# Patient Record
Sex: FEMALE | Race: WHITE | NOT HISPANIC OR LATINO | Employment: FULL TIME | URBAN - METROPOLITAN AREA
[De-identification: names, ages, dates, MRNs, and addresses within clinical notes are randomized per-mention and may not be internally consistent; named-entity substitution may affect disease eponyms.]

---

## 2024-04-22 ENCOUNTER — OFFICE VISIT (OUTPATIENT)
Dept: PRIMARY CARE | Facility: CLINIC | Age: 49
End: 2024-04-22
Payer: COMMERCIAL

## 2024-04-22 ENCOUNTER — TELEPHONE (OUTPATIENT)
Dept: PRIMARY CARE | Facility: CLINIC | Age: 49
End: 2024-04-22

## 2024-04-22 VITALS
OXYGEN SATURATION: 97 % | WEIGHT: 224 LBS | SYSTOLIC BLOOD PRESSURE: 128 MMHG | HEART RATE: 90 BPM | BODY MASS INDEX: 33.95 KG/M2 | HEIGHT: 68 IN | TEMPERATURE: 98.6 F | DIASTOLIC BLOOD PRESSURE: 83 MMHG

## 2024-04-22 DIAGNOSIS — Z12.11 SCREENING FOR COLON CANCER: ICD-10-CM

## 2024-04-22 DIAGNOSIS — E55.9 VITAMIN D DEFICIENCY: ICD-10-CM

## 2024-04-22 DIAGNOSIS — R06.83 SNORING: ICD-10-CM

## 2024-04-22 DIAGNOSIS — Z00.00 WELL ADULT EXAM: Primary | ICD-10-CM

## 2024-04-22 DIAGNOSIS — E66.09 CLASS 1 OBESITY DUE TO EXCESS CALORIES WITH BODY MASS INDEX (BMI) OF 34.0 TO 34.9 IN ADULT, UNSPECIFIED WHETHER SERIOUS COMORBIDITY PRESENT: ICD-10-CM

## 2024-04-22 DIAGNOSIS — R73.03 PRE-DIABETES: ICD-10-CM

## 2024-04-22 DIAGNOSIS — Z13.6 SCREENING FOR HEART DISEASE: ICD-10-CM

## 2024-04-22 DIAGNOSIS — Z12.31 SCREENING MAMMOGRAM FOR BREAST CANCER: ICD-10-CM

## 2024-04-22 PROCEDURE — 93000 ELECTROCARDIOGRAM COMPLETE: CPT

## 2024-04-22 PROCEDURE — 1036F TOBACCO NON-USER: CPT

## 2024-04-22 PROCEDURE — 99386 PREV VISIT NEW AGE 40-64: CPT

## 2024-04-22 PROCEDURE — 99202 OFFICE O/P NEW SF 15 MIN: CPT

## 2024-04-22 PROCEDURE — 3008F BODY MASS INDEX DOCD: CPT

## 2024-04-22 ASSESSMENT — ENCOUNTER SYMPTOMS
FEVER: 0
DYSPHORIC MOOD: 0
DEPRESSION: 0
UNEXPECTED WEIGHT CHANGE: 0
HEMATURIA: 0
SHORTNESS OF BREATH: 0
WEAKNESS: 0
MYALGIAS: 0
RHINORRHEA: 0
ABDOMINAL PAIN: 0
DYSURIA: 0
COUGH: 0
TROUBLE SWALLOWING: 0
BLOOD IN STOOL: 0
SORE THROAT: 0
ARTHRALGIAS: 0
NAUSEA: 0
NERVOUS/ANXIOUS: 0
NUMBNESS: 0
VOMITING: 0
OCCASIONAL FEELINGS OF UNSTEADINESS: 0
HEADACHES: 0
CHILLS: 0
LOSS OF SENSATION IN FEET: 0

## 2024-04-22 ASSESSMENT — LIFESTYLE VARIABLES
HOW MANY STANDARD DRINKS CONTAINING ALCOHOL DO YOU HAVE ON A TYPICAL DAY: 1 OR 2
AUDIT-C TOTAL SCORE: 2
HOW OFTEN DO YOU HAVE SIX OR MORE DRINKS ON ONE OCCASION: LESS THAN MONTHLY
HOW OFTEN DO YOU HAVE A DRINK CONTAINING ALCOHOL: MONTHLY OR LESS
SKIP TO QUESTIONS 9-10: 0

## 2024-04-22 ASSESSMENT — COLUMBIA-SUICIDE SEVERITY RATING SCALE - C-SSRS
2. HAVE YOU ACTUALLY HAD ANY THOUGHTS OF KILLING YOURSELF?: NO
6. HAVE YOU EVER DONE ANYTHING, STARTED TO DO ANYTHING, OR PREPARED TO DO ANYTHING TO END YOUR LIFE?: NO
1. IN THE PAST MONTH, HAVE YOU WISHED YOU WERE DEAD OR WISHED YOU COULD GO TO SLEEP AND NOT WAKE UP?: NO

## 2024-04-22 ASSESSMENT — PATIENT HEALTH QUESTIONNAIRE - PHQ9
1. LITTLE INTEREST OR PLEASURE IN DOING THINGS: NOT AT ALL
2. FEELING DOWN, DEPRESSED OR HOPELESS: NOT AT ALL
SUM OF ALL RESPONSES TO PHQ9 QUESTIONS 1 AND 2: 0

## 2024-04-22 NOTE — PROGRESS NOTES
Subjective   Patient ID: Megan Singh is a 48 y.o. female who presents for New Patient Visit (CPE ).    HPI   Megan Singh is a new patient here to establish care and seen for her comprehensive physical exam. PMH, SH, FH, medications were reviewed and updated.     CHRONIC ISSUES:   Pre-diabetes: On no medication. Most recent A1c 6.1 (10/22).   HLD: On no medication. Most recent LDL from (10/22).     Snoring:  reports snoring. Denies nighttime awakenings, apneic events. 6-7 hours of sleep per night, feels rested when waking up.   Takes B12 and Vit D3, fiber     IMMUNIZATIONS:   Influenza - Did not get in 2023  Tdap - 2022    LUNG CANCER SCREENING (50-77 y.o. with 20+ pack year hx & current smoker or quit <15yrs ago)  NEVER smoker    COLORECTAL SCREENING (From 46 or 10yrs younger than family diagnosis)  Last colonoscopy - Never   Next colonoscopy due - Today   Relevant FHx - None    GYN  LMP - LMP 04/02/2024  Last Pap - 12/2022 NILM, HPV not ordered  Next Pap due - 12/2025, Follows with Dr. Field     MAMMOGRAM SCREENING (From age 41)   Last mammogram - 01/23  Next mammogram due - Today     Review of Systems   Constitutional:  Negative for chills, fever and unexpected weight change.   HENT:  Negative for congestion, ear pain, hearing loss, rhinorrhea, sore throat and trouble swallowing.    Eyes:  Negative for visual disturbance.   Respiratory:  Negative for cough and shortness of breath.    Cardiovascular:  Negative for chest pain and leg swelling.   Gastrointestinal:  Negative for abdominal pain, blood in stool, nausea and vomiting.   Genitourinary:  Negative for dysuria and hematuria.   Musculoskeletal:  Negative for arthralgias and myalgias.   Skin:  Negative for rash.   Neurological:  Negative for weakness, numbness and headaches.   Psychiatric/Behavioral:  Negative for dysphoric mood. The patient is not nervous/anxious.      Objective   /83 (BP Location: Left arm, Patient Position: Sitting,  "BP Cuff Size: Adult)   Pulse 90   Temp 37 °C (98.6 °F)   Ht 1.727 m (5' 8\")   Wt 102 kg (224 lb)   SpO2 97%   BMI 34.06 kg/m²     Physical Exam  Vitals and nursing note reviewed.   Constitutional:       General: She is not in acute distress.  HENT:      Right Ear: Tympanic membrane and ear canal normal.      Left Ear: Tympanic membrane and ear canal normal.      Nose: Nose normal.      Mouth/Throat:      Mouth: Mucous membranes are moist.   Eyes:      Extraocular Movements: Extraocular movements intact.      Pupils: Pupils are equal, round, and reactive to light.   Neck:      Vascular: No carotid bruit.   Cardiovascular:      Rate and Rhythm: Normal rate and regular rhythm.   Pulmonary:      Effort: Pulmonary effort is normal.      Breath sounds: Normal breath sounds.   Abdominal:      General: Abdomen is flat.      Palpations: Abdomen is soft.      Tenderness: There is no abdominal tenderness.   Musculoskeletal:         General: Normal range of motion.   Lymphadenopathy:      Cervical: No cervical adenopathy.   Skin:     General: Skin is warm.      Findings: No rash.   Neurological:      General: No focal deficit present.      Mental Status: She is alert.   Psychiatric:         Mood and Affect: Mood normal.       Assessment/Plan   Problem List Items Addressed This Visit             ICD-10-CM    Vitamin D deficiency E55.9     Continue current supplementation.  Labs ordered, will follow-up with results.         Relevant Orders    Vitamin D 25-Hydroxy,Total (for eval of Vitamin D levels)    Pre-diabetes R73.03     Keep off medication at this time.  Labs ordered, will follow-up with results.  Follow low carbohydrate diet with increase in lean protein and vegetables.  Increase exercise.  Recheck in 6 months.         Relevant Orders    Comprehensive metabolic panel    Hemoglobin A1c     Other Visit Diagnoses         Codes    Well adult exam    -  Primary  Preventative measures discussed. Labs ordered, will " follow-up with results.  Immunizations discussed. Z00.00    Relevant Orders    Lipid Panel    Urinalysis with Reflex Microscopic    Comprehensive metabolic panel    CBC and Auto Differential    ECG 12 lead (Clinic Performed)    Screening for heart disease     Z13.6    Relevant Orders    Lipid Panel    Screening mammogram for breast cancer     Z12.31    Relevant Orders    BI mammo bilateral screening tomosynthesis    Screening for colon cancer     Z12.11    Relevant Orders    Colonoscopy Screening; Average Risk Patient    Snoring    Chronic.  Will order HSAT and follow-up with results.  Discussed lifestyle modifications including decreased alcohol consumption, weight loss, sleeping on side. R06.83    Relevant Orders    Home sleep apnea test (HSAT)    Class 1 obesity due to excess calories with body mass index (BMI) of 34.0 to 34.9 in adult, unspecified whether serious comorbidity present     Follow healthy diet consisting of fruits, vegetables, fiber, protein food choices on a regular basis and be aware of portion/serving sizes. Reduce carbohydrate consumption and always consume with protein and fat. Avoid foods high in saturated/trans fat, high salt content, sweets and beverages with added sugars.   E66.09, Z68.34    Relevant Orders    Home sleep apnea test (HSAT)

## 2024-04-22 NOTE — ASSESSMENT & PLAN NOTE
Keep off medication at this time.  Labs ordered, will follow-up with results.  Follow low carbohydrate diet with increase in lean protein and vegetables.  Increase exercise.  Recheck in 6 months.

## 2024-04-27 ENCOUNTER — HOSPITAL ENCOUNTER (OUTPATIENT)
Dept: RADIOLOGY | Facility: HOSPITAL | Age: 49
Discharge: HOME | End: 2024-04-27
Payer: COMMERCIAL

## 2024-04-27 VITALS — BODY MASS INDEX: 33.65 KG/M2 | WEIGHT: 222 LBS | HEIGHT: 68 IN

## 2024-04-27 DIAGNOSIS — Z12.31 SCREENING MAMMOGRAM FOR BREAST CANCER: ICD-10-CM

## 2024-04-27 PROCEDURE — 77063 BREAST TOMOSYNTHESIS BI: CPT | Performed by: RADIOLOGY

## 2024-04-27 PROCEDURE — 77067 SCR MAMMO BI INCL CAD: CPT | Performed by: RADIOLOGY

## 2024-04-27 PROCEDURE — 77067 SCR MAMMO BI INCL CAD: CPT

## 2024-05-05 ENCOUNTER — CLINICAL SUPPORT (OUTPATIENT)
Dept: SLEEP MEDICINE | Facility: HOSPITAL | Age: 49
End: 2024-05-05
Payer: COMMERCIAL

## 2024-05-05 DIAGNOSIS — E66.09 CLASS 1 OBESITY DUE TO EXCESS CALORIES WITH BODY MASS INDEX (BMI) OF 34.0 TO 34.9 IN ADULT, UNSPECIFIED WHETHER SERIOUS COMORBIDITY PRESENT: ICD-10-CM

## 2024-05-05 DIAGNOSIS — R06.83 SNORING: ICD-10-CM

## 2024-05-05 PROCEDURE — 95806 SLEEP STUDY UNATT&RESP EFFT: CPT | Performed by: ALLERGY & IMMUNOLOGY

## 2024-05-06 DIAGNOSIS — Z12.11 COLON CANCER SCREENING: ICD-10-CM

## 2024-05-06 RX ORDER — POLYETHYLENE GLYCOL 3350, SODIUM CHLORIDE, SODIUM BICARBONATE, POTASSIUM CHLORIDE 420; 11.2; 5.72; 1.48 G/4L; G/4L; G/4L; G/4L
POWDER, FOR SOLUTION ORAL
Qty: 4000 ML | Refills: 0 | Status: SHIPPED | OUTPATIENT
Start: 2024-05-06

## 2024-05-15 ENCOUNTER — LAB (OUTPATIENT)
Dept: LAB | Facility: LAB | Age: 49
End: 2024-05-15
Payer: COMMERCIAL

## 2024-05-15 DIAGNOSIS — R73.03 PRE-DIABETES: ICD-10-CM

## 2024-05-15 DIAGNOSIS — E55.9 VITAMIN D DEFICIENCY: ICD-10-CM

## 2024-05-15 DIAGNOSIS — Z13.6 SCREENING FOR HEART DISEASE: ICD-10-CM

## 2024-05-15 DIAGNOSIS — Z00.00 WELL ADULT EXAM: ICD-10-CM

## 2024-05-15 LAB
25(OH)D3 SERPL-MCNC: 26 NG/ML (ref 31–100)
ALBUMIN SERPL-MCNC: 4.4 G/DL (ref 3.5–5)
ALP BLD-CCNC: 89 U/L (ref 35–125)
ALT SERPL-CCNC: 42 U/L (ref 5–40)
ANION GAP SERPL CALC-SCNC: 11 MMOL/L
APPEARANCE UR: ABNORMAL
AST SERPL-CCNC: 28 U/L (ref 5–40)
BASOPHILS # BLD AUTO: 0.04 X10*3/UL (ref 0–0.1)
BASOPHILS NFR BLD AUTO: 0.6 %
BILIRUB SERPL-MCNC: 0.4 MG/DL (ref 0.1–1.2)
BILIRUB UR STRIP.AUTO-MCNC: NEGATIVE MG/DL
BUN SERPL-MCNC: 16 MG/DL (ref 8–25)
CALCIUM SERPL-MCNC: 9.3 MG/DL (ref 8.5–10.4)
CHLORIDE SERPL-SCNC: 101 MMOL/L (ref 97–107)
CHOLEST SERPL-MCNC: 221 MG/DL (ref 133–200)
CHOLEST/HDLC SERPL: 4.7 {RATIO}
CO2 SERPL-SCNC: 27 MMOL/L (ref 24–31)
COLOR UR: YELLOW
CREAT SERPL-MCNC: 1 MG/DL (ref 0.4–1.6)
EGFRCR SERPLBLD CKD-EPI 2021: 70 ML/MIN/1.73M*2
EOSINOPHIL # BLD AUTO: 0.23 X10*3/UL (ref 0–0.7)
EOSINOPHIL NFR BLD AUTO: 3.5 %
ERYTHROCYTE [DISTWIDTH] IN BLOOD BY AUTOMATED COUNT: 13.1 % (ref 11.5–14.5)
EST. AVERAGE GLUCOSE BLD GHB EST-MCNC: 140 MG/DL
GLUCOSE SERPL-MCNC: 106 MG/DL (ref 65–99)
GLUCOSE UR STRIP.AUTO-MCNC: NORMAL MG/DL
HBA1C MFR BLD: 6.5 %
HCT VFR BLD AUTO: 41.3 % (ref 36–46)
HDLC SERPL-MCNC: 47 MG/DL
HGB BLD-MCNC: 13.2 G/DL (ref 12–16)
IMM GRANULOCYTES # BLD AUTO: 0.02 X10*3/UL (ref 0–0.7)
IMM GRANULOCYTES NFR BLD AUTO: 0.3 % (ref 0–0.9)
KETONES UR STRIP.AUTO-MCNC: NEGATIVE MG/DL
LDLC SERPL CALC-MCNC: 142 MG/DL (ref 65–130)
LEUKOCYTE ESTERASE UR QL STRIP.AUTO: NEGATIVE
LYMPHOCYTES # BLD AUTO: 2.55 X10*3/UL (ref 1.2–4.8)
LYMPHOCYTES NFR BLD AUTO: 39.1 %
MCH RBC QN AUTO: 28 PG (ref 26–34)
MCHC RBC AUTO-ENTMCNC: 32 G/DL (ref 32–36)
MCV RBC AUTO: 88 FL (ref 80–100)
MONOCYTES # BLD AUTO: 0.67 X10*3/UL (ref 0.1–1)
MONOCYTES NFR BLD AUTO: 10.3 %
NEUTROPHILS # BLD AUTO: 3.02 X10*3/UL (ref 1.2–7.7)
NEUTROPHILS NFR BLD AUTO: 46.2 %
NITRITE UR QL STRIP.AUTO: NEGATIVE
NRBC BLD-RTO: 0 /100 WBCS (ref 0–0)
PH UR STRIP.AUTO: 5 [PH]
PLATELET # BLD AUTO: 288 X10*3/UL (ref 150–450)
POTASSIUM SERPL-SCNC: 4.5 MMOL/L (ref 3.4–5.1)
PROT SERPL-MCNC: 7 G/DL (ref 5.9–7.9)
PROT UR STRIP.AUTO-MCNC: NEGATIVE MG/DL
RBC # BLD AUTO: 4.71 X10*6/UL (ref 4–5.2)
RBC # UR STRIP.AUTO: NEGATIVE /UL
SODIUM SERPL-SCNC: 139 MMOL/L (ref 133–145)
SP GR UR STRIP.AUTO: 1.02
TRIGL SERPL-MCNC: 158 MG/DL (ref 40–150)
UROBILINOGEN UR STRIP.AUTO-MCNC: NORMAL MG/DL
WBC # BLD AUTO: 6.5 X10*3/UL (ref 4.4–11.3)

## 2024-05-15 PROCEDURE — 81003 URINALYSIS AUTO W/O SCOPE: CPT

## 2024-05-15 PROCEDURE — 36415 COLL VENOUS BLD VENIPUNCTURE: CPT

## 2024-05-15 PROCEDURE — 85025 COMPLETE CBC W/AUTO DIFF WBC: CPT

## 2024-05-15 PROCEDURE — 83036 HEMOGLOBIN GLYCOSYLATED A1C: CPT

## 2024-05-15 PROCEDURE — 80061 LIPID PANEL: CPT

## 2024-05-15 PROCEDURE — 82306 VITAMIN D 25 HYDROXY: CPT

## 2024-05-15 PROCEDURE — 80053 COMPREHEN METABOLIC PANEL: CPT

## 2024-05-22 ENCOUNTER — TELEPHONE (OUTPATIENT)
Dept: PRIMARY CARE | Facility: CLINIC | Age: 49
End: 2024-05-22

## 2024-05-22 ENCOUNTER — OFFICE VISIT (OUTPATIENT)
Dept: PRIMARY CARE | Facility: CLINIC | Age: 49
End: 2024-05-22
Payer: COMMERCIAL

## 2024-05-22 VITALS
HEART RATE: 77 BPM | WEIGHT: 222 LBS | DIASTOLIC BLOOD PRESSURE: 78 MMHG | SYSTOLIC BLOOD PRESSURE: 120 MMHG | OXYGEN SATURATION: 98 % | BODY MASS INDEX: 33.75 KG/M2

## 2024-05-22 DIAGNOSIS — G47.33 OSA (OBSTRUCTIVE SLEEP APNEA): Primary | ICD-10-CM

## 2024-05-22 DIAGNOSIS — E11.65 TYPE 2 DIABETES MELLITUS WITH HYPERGLYCEMIA, WITHOUT LONG-TERM CURRENT USE OF INSULIN (MULTI): ICD-10-CM

## 2024-05-22 DIAGNOSIS — E78.2 MIXED HYPERLIPIDEMIA: ICD-10-CM

## 2024-05-22 DIAGNOSIS — E55.9 VITAMIN D DEFICIENCY: ICD-10-CM

## 2024-05-22 PROCEDURE — 3050F LDL-C >= 130 MG/DL: CPT

## 2024-05-22 PROCEDURE — 3044F HG A1C LEVEL LT 7.0%: CPT

## 2024-05-22 PROCEDURE — 3008F BODY MASS INDEX DOCD: CPT

## 2024-05-22 PROCEDURE — 3074F SYST BP LT 130 MM HG: CPT

## 2024-05-22 PROCEDURE — 1036F TOBACCO NON-USER: CPT

## 2024-05-22 PROCEDURE — 99214 OFFICE O/P EST MOD 30 MIN: CPT

## 2024-05-22 PROCEDURE — 3078F DIAST BP <80 MM HG: CPT

## 2024-05-22 ASSESSMENT — ENCOUNTER SYMPTOMS
FATIGUE: 1
CHILLS: 0
LIGHT-HEADEDNESS: 0
FEVER: 0
DIZZINESS: 0
SHORTNESS OF BREATH: 0
NAUSEA: 0
VOMITING: 0

## 2024-05-22 ASSESSMENT — PATIENT HEALTH QUESTIONNAIRE - PHQ9
1. LITTLE INTEREST OR PLEASURE IN DOING THINGS: NOT AT ALL
SUM OF ALL RESPONSES TO PHQ9 QUESTIONS 1 AND 2: 0
2. FEELING DOWN, DEPRESSED OR HOPELESS: NOT AT ALL

## 2024-05-22 ASSESSMENT — PAIN SCALES - GENERAL: PAINLEVEL: 0-NO PAIN

## 2024-05-22 NOTE — ASSESSMENT & PLAN NOTE
Mild. Discussed lifestyle modifications vs CPAP therapy. Patient would like to make lifestyle changes and see if symptoms improve with weight loss.

## 2024-05-22 NOTE — PROGRESS NOTES
Subjective   Patient ID: Megan Singh is a 48 y.o. female who presents for Follow-up (Sleep study results and lab work.).    VINCE Gzuman is seen for sleep study follow up. Completed HSAT on 5/5/24. Results support diagnosis of mild LILIANE. Patient reports snoring, some daytime sleepiness. Denies apneic events. New diagnosis of DM. Vit D deficient, ran out of pills and has not gotten more.     Review of Systems   Constitutional:  Positive for fatigue. Negative for chills and fever.   Respiratory:  Negative for shortness of breath.    Cardiovascular:  Negative for chest pain.   Gastrointestinal:  Negative for nausea and vomiting.   Neurological:  Negative for dizziness and light-headedness.     Objective   /78   Pulse 77   Wt 101 kg (222 lb)   SpO2 98%   BMI 33.75 kg/m²     Physical Exam  Vitals and nursing note reviewed.   Constitutional:       General: She is not in acute distress.     Appearance: She is obese.   Eyes:      Extraocular Movements: Extraocular movements intact.      Conjunctiva/sclera: Conjunctivae normal.   Cardiovascular:      Rate and Rhythm: Normal rate.   Pulmonary:      Effort: Pulmonary effort is normal.   Musculoskeletal:         General: Normal range of motion.      Cervical back: Neck supple.   Neurological:      General: No focal deficit present.      Mental Status: She is alert.   Psychiatric:         Mood and Affect: Mood normal.       Assessment/Plan   Problem List Items Addressed This Visit             ICD-10-CM    Vitamin D deficiency E55.9     Restart Vit D3 5,000 international units daily. Recheck in 3 months.           Type 2 diabetes mellitus with hyperglycemia, without long-term current use of insulin (Multi) E11.65     New diagnosis. Start Ozempic 0.25mg/weekly. Risks and benefits of medication discussed and prescribed. Low carbohydrate diet and increase in activity. Follow up with Mary Henry in 1 month, follow up with me in 3 months for lab work.           Relevant Medications    semaglutide 0.25 mg or 0.5 mg (2 mg/3 mL) pen injector (Start on 5/26/2024)    Mixed hyperlipidemia E78.2     Chronic. Discussed recommendation for statin therapy due to diagnosis of diabetes. Will plan to start at next visit after patient has been started on Ozempic. Low carbohydrate diet and increase in activity.          LILIANE (obstructive sleep apnea) - Primary G47.33     Mild. Discussed lifestyle modifications vs CPAP therapy. Patient would like to make lifestyle changes and see if symptoms improve with weight loss.

## 2024-05-22 NOTE — ASSESSMENT & PLAN NOTE
Chronic. Discussed recommendation for statin therapy due to diagnosis of diabetes. Will plan to start at next visit after patient has been started on Ozempic. Low carbohydrate diet and increase in activity.

## 2024-05-22 NOTE — TELEPHONE ENCOUNTER
Cindy [facility] called asking the duration of the semaglutide. They need to know how many weeks the patient is to be using the lower dosage of medication    [facility] in [place]: [phone]

## 2024-06-19 ENCOUNTER — APPOINTMENT (OUTPATIENT)
Dept: PRIMARY CARE | Facility: CLINIC | Age: 49
End: 2024-06-19
Payer: COMMERCIAL

## 2024-06-19 DIAGNOSIS — E11.65 TYPE 2 DIABETES MELLITUS WITH HYPERGLYCEMIA, WITHOUT LONG-TERM CURRENT USE OF INSULIN (MULTI): Primary | ICD-10-CM

## 2024-06-19 NOTE — PROGRESS NOTES
DIABETES MELLITUS  E11.9    Megan Singh is a 48 y.o. female who presents for education regarding her diabetes.      Referring Provider: Seble Gruber PA-C     New onset diabetes.  Pt had GDM with pregnancy.      LAB REVIEW   Hemoglobin A1C (%)   Date Value   05/15/2024 6.5 (H)   10/29/2022 6.1 (H)     Glucose   Date Value   05/15/2024 106 mg/dL (H)   10/29/2022 116 MG/DL (H)   01/19/2021 111 MG/DL (H)     Creatinine   Date Value   05/15/2024 1.00 mg/dL   10/29/2022 0.9 MG/DL   01/19/2021 1.0 MG/DL     eGFR (mL/min/1.73m*2)   Date Value   05/15/2024 70     ESTIMATED GFR (mL/min/1.73 m2)   Date Value   10/29/2022 80   01/19/2021 64     Lab Results   Component Value Date    CREATININE 1.00 05/15/2024     Lab Results   Component Value Date    CHOL 221 (H) 05/15/2024    CHOL 195 10/29/2022    CHOL 196 01/19/2021     Lab Results   Component Value Date    HDL 47.0 (L) 05/15/2024    HDL 45 (L) 10/29/2022    HDL 55 01/19/2021     Lab Results   Component Value Date    LDLCALC 142 (H) 05/15/2024    LDLCALC 130 10/29/2022    LDLCALC 124 01/19/2021     Lab Results   Component Value Date    TRIG 158 (H) 05/15/2024    TRIG 98 10/29/2022    TRIG 85 01/19/2021       CURRENT PHARMACOTHERAPY  Current Outpatient Medications on File Prior to Visit   Medication Sig Dispense Refill    polyethylene glycol-electrolytes (Nulytely) 420 gram solution Drink 1/2 starting at 6 pm the night before your procedure then drink the 2nd 1/2 5 hours before procedure arrival tme 4000 mL 0    semaglutide 0.25 mg or 0.5 mg (2 mg/3 mL) pen injector Inject 0.25 mg under the skin 1 (one) time per week. For 4 weeks. Increase to 0.5mg weekly thereafter. 3 mL 0     No current facility-administered medications on file prior to visit.       SMBG:  Pt does not check glucose at home  Sample Contour Next machine/strips/lancets provided    EDUCATION:  Patient was instructed on diabetes care and skills according to the Standards of Care established by the  American Diabetes Association and the [facility] Diabetes Care and Education Curriculum.    Topics reviewed included:  Basic pathology of T2DM  Recognize symptoms of high and low blood sugar  Rule of 15: eating 15 g of carbs when BG less than 70 (half cup juice/soda, 3-4 glucose tabs).  Use of glucometer/CGM and target glucose levels  Meal planning and incorporate a realistic healthy diet consisting of fruits, vegetables, fiber, protein food choices on a regular basis and be aware of portion/serving sizes. Reduce carbohydrate consumption and always consume with protein and fat. Avoid foods high in saturated/trans fat, high salt content, and sweets and beverages with added sugars.  CHO content limits: women 15g/snack, 45g/meals; men 20g/snack, 60g/meals  Limit alcohol consumption; alcohol may affect your blood sugar and cause hypoglycemia.   Stay active and incorporate 30 mins of exercise into your daily routine at least 5 days per week in order to manage your weight and increase the body's acceptance of insulin.  Importance of eye/foot/dental care  Detection and prevention of chronic complications  Blood glucose goals:   - Fasting B - 130 mg/dL  - Postprandial BG: less than 180 mg/dL  - A1c: less than 7%  Patient actively participated in visit and is motivated to make changes in order to improve average blood glucose, time in range and A1c.   Answered all patient questions and concerns; provided [facility] office phone number if issues/questions arise.    PLAN:  Incorporate diabetes education into daily routine in order to reduce A1c, increase TIR.  Continue all meds under the continuation of care with the referring provider and clinical pharmacy team.    Diabetes Education provided by [facility], [facility], [facility]

## 2024-07-01 ENCOUNTER — TELEPHONE (OUTPATIENT)
Dept: PRIMARY CARE | Facility: CLINIC | Age: 49
End: 2024-07-01
Payer: COMMERCIAL

## 2024-07-01 DIAGNOSIS — E11.65 TYPE 2 DIABETES MELLITUS WITH HYPERGLYCEMIA, WITHOUT LONG-TERM CURRENT USE OF INSULIN (MULTI): ICD-10-CM

## 2024-07-01 NOTE — TELEPHONE ENCOUNTER
Patient requesting a refill on Ozempic 0.5 sent to [facility] in [place].    Contact: [phone]

## 2024-07-15 ASSESSMENT — ENCOUNTER SYMPTOMS
BLACKOUTS: 0
TREMORS: 0
VISUAL CHANGE: 0
WEIGHT LOSS: 0
POLYPHAGIA: 0
HEADACHES: 0
NERVOUS/ANXIOUS: 0
FATIGUE: 0
SEIZURES: 0
CONFUSION: 0
SWEATS: 0
HUNGER: 0
POLYDIPSIA: 0
DIZZINESS: 0
SPEECH DIFFICULTY: 0
WEAKNESS: 0
BLURRED VISION: 0

## 2024-07-16 ENCOUNTER — APPOINTMENT (OUTPATIENT)
Dept: PRIMARY CARE | Facility: CLINIC | Age: 49
End: 2024-07-16
Payer: COMMERCIAL

## 2024-07-16 VITALS — BODY MASS INDEX: 31.69 KG/M2 | WEIGHT: 208.4 LBS

## 2024-07-16 DIAGNOSIS — E11.65 TYPE 2 DIABETES MELLITUS WITH HYPERGLYCEMIA, WITHOUT LONG-TERM CURRENT USE OF INSULIN (MULTI): ICD-10-CM

## 2024-07-16 NOTE — PROGRESS NOTES
Diabetes Management  E11.9    HPI  Megan Singh is a 48 y.o. female who presents for a follow-up evaluation of her Type 2 diabetes mellitus.     Referring Provider: Salena Gruber PA-C     CURRENT DM PHARMACOTHERAPY  Ozempic 0.5mg weekly - reports constipation not relieved with stool softener    LAB REVIEW   Lab Results   Component Value Date    HGBA1C 6.5 (H) 05/15/2024    HGBA1C 6.1 (H) 10/29/2022     Lab Results   Component Value Date    CREATININE 1.00 05/15/2024    GLUCOSE 106 (H) 05/15/2024    EGFR 70 05/15/2024     Lab Results   Component Value Date    TRIG 158 (H) 05/15/2024    CHOL 221 (H) 05/15/2024    LDLCALC 142 (H) 05/15/2024    HDL 47.0 (L) 05/15/2024       HISTORICAL PHARMACOTHERAPY  Current Outpatient Medications on File Prior to Visit   Medication Sig Dispense Refill    polyethylene glycol-electrolytes (Nulytely) 420 gram solution Drink 1/2 starting at 6 pm the night before your procedure then drink the 2nd 1/2 5 hours before procedure arrival tme 4000 mL 0    semaglutide 0.25 mg or 0.5 mg (2 mg/3 mL) pen injector Inject 0.5 mg under the skin 1 (one) time per week. 3 mL 0     No current facility-administered medications on file prior to visit.       SMBG  Pt brings contour meter with her for review  FBS 14 day average 104  Usually 90's to 100's     RECOMMENDATIONS/PLAN  Pt diabetes is  new onset  with most recent A1c of 6.5% (goal < 7 %).   Discussed constipation in detail.  Instructed pt to start daily Miralax.  Can decrease to every other day or every third day as needed.    2.   Continue Ozempic 0.5mg.  Can consider dose increase if constipation resolves with Miralax use.       Plan:   START Miralax, 1 capful daily or as needed   Continue Ozempic 0.5mg weekly  Follow up with PCP as scheduled    Treatment and plan discussed with SALENA ALSTON, MERLENE GARCIA [facility]    Verbal consent to manage patient's drug therapy was obtained from the patient or an individual authorized to act on behalf  of the patient.  Patient was informed they may decline to participate or withdraw from participation in pharmacy services at any time.  Answers submitted by the patient for this visit:  Diabetes Questionnaire (Submitted on 7/15/2024)  Chief Complaint: Diabetes problem  Diabetes type: type 2  MedicAlert ID: No  Disease duration: 2 Months  blurred vision: No  chest pain: No  fatigue: No  foot paresthesias: No  foot ulcerations: No  polydipsia: No  polyphagia: No  polyuria: No  visual change: No  weakness: No  weight loss: No  Symptom course: improving  confusion: No  speech difficulty: No  dizziness: No  nervous/anxious: No  headaches: No  hunger: No  mood changes: No  pallor: No  seizures: No  tremors: No  sleepiness: No  sweats: No  blackouts: No  hospitalization: No  nocturnal hypoglycemia: No  required assistance: No  required glucagon: No  CVA: No  heart disease: No  impotence: No  nephropathy: No  peripheral neuropathy: No  PVD: No  retinopathy: No  CAD risks: no known risk factors  Current treatments: diet, oral agent (monotherapy)  Treatment compliance: most of the time  Home blood tests: 1-2 x per week  Home urines: <1 x per month  Monitoring compliance: adequate  breakfast time: 8-9 am  breakfast glucose level:   lunch time: 12-1 pm  dinner time: 7-8 pm  Bedtime: 10-11 pm  Weight trend: decreasing steadily  Current diet: diabetic  Meal planning: avoidance of concentrated sweets, carbohydrate counting  Exercise: intermittently  Dietitian visit: Yes  Eye exam current: Yes  Sees podiatrist: No

## 2024-08-02 ENCOUNTER — PATIENT MESSAGE (OUTPATIENT)
Dept: PRIMARY CARE | Facility: CLINIC | Age: 49
End: 2024-08-02
Payer: COMMERCIAL

## 2024-08-02 DIAGNOSIS — E11.65 TYPE 2 DIABETES MELLITUS WITH HYPERGLYCEMIA, WITHOUT LONG-TERM CURRENT USE OF INSULIN (MULTI): ICD-10-CM

## 2024-08-21 ENCOUNTER — APPOINTMENT (OUTPATIENT)
Dept: PRIMARY CARE | Facility: CLINIC | Age: 49
End: 2024-08-21
Payer: COMMERCIAL

## 2024-08-21 ENCOUNTER — TELEPHONE (OUTPATIENT)
Dept: PRIMARY CARE | Facility: CLINIC | Age: 49
End: 2024-08-21

## 2024-08-21 VITALS
BODY MASS INDEX: 28.79 KG/M2 | WEIGHT: 190 LBS | DIASTOLIC BLOOD PRESSURE: 62 MMHG | SYSTOLIC BLOOD PRESSURE: 116 MMHG | HEART RATE: 62 BPM | HEIGHT: 68 IN | OXYGEN SATURATION: 98 %

## 2024-08-21 DIAGNOSIS — E78.2 MIXED HYPERLIPIDEMIA: ICD-10-CM

## 2024-08-21 DIAGNOSIS — E11.9 TYPE 2 DIABETES MELLITUS WITHOUT COMPLICATION, WITHOUT LONG-TERM CURRENT USE OF INSULIN (MULTI): Primary | ICD-10-CM

## 2024-08-21 DIAGNOSIS — E11.9 TYPE 2 DIABETES MELLITUS WITHOUT COMPLICATION, WITHOUT LONG-TERM CURRENT USE OF INSULIN (MULTI): ICD-10-CM

## 2024-08-21 LAB — POC HEMOGLOBIN A1C: 5.8 % (ref 4.2–6.5)

## 2024-08-21 PROCEDURE — 3050F LDL-C >= 130 MG/DL: CPT

## 2024-08-21 PROCEDURE — 3008F BODY MASS INDEX DOCD: CPT

## 2024-08-21 PROCEDURE — 3044F HG A1C LEVEL LT 7.0%: CPT

## 2024-08-21 PROCEDURE — 1036F TOBACCO NON-USER: CPT

## 2024-08-21 PROCEDURE — 99214 OFFICE O/P EST MOD 30 MIN: CPT

## 2024-08-21 PROCEDURE — 83036 HEMOGLOBIN GLYCOSYLATED A1C: CPT

## 2024-08-21 PROCEDURE — 3074F SYST BP LT 130 MM HG: CPT

## 2024-08-21 PROCEDURE — 3078F DIAST BP <80 MM HG: CPT

## 2024-08-21 RX ORDER — ROSUVASTATIN CALCIUM 5 MG/1
5 TABLET, COATED ORAL DAILY
Qty: 90 TABLET | Refills: 0 | Status: SHIPPED | OUTPATIENT
Start: 2024-08-21 | End: 2024-11-19

## 2024-08-21 ASSESSMENT — PATIENT HEALTH QUESTIONNAIRE - PHQ9
SUM OF ALL RESPONSES TO PHQ9 QUESTIONS 1 AND 2: 0
2. FEELING DOWN, DEPRESSED OR HOPELESS: NOT AT ALL
1. LITTLE INTEREST OR PLEASURE IN DOING THINGS: NOT AT ALL

## 2024-08-21 ASSESSMENT — PAIN SCALES - GENERAL: PAINLEVEL: 0-NO PAIN

## 2024-08-21 ASSESSMENT — COLUMBIA-SUICIDE SEVERITY RATING SCALE - C-SSRS: 1. IN THE PAST MONTH, HAVE YOU WISHED YOU WERE DEAD OR WISHED YOU COULD GO TO SLEEP AND NOT WAKE UP?: NO

## 2024-08-21 NOTE — ASSESSMENT & PLAN NOTE
Chronic. Start Crestor 5mg daily. Risks and benefits of medication discussed and prescribed. Low fat diet and increase in activity. Recheck in 3 months.

## 2024-08-21 NOTE — PROGRESS NOTES
"Subjective   Patient ID: Megan Singh is a 48 y.o. female who presents for diabetes check .    HPI   DM: On Ozempic 0.5mg/week. Takes Miralax a few times/week, constipation has improved. Last A1C 5.8 down from 6.5. Sugar levels low 100's. Last microalbumin - N/A and GFR - 70. On statin/ACEI - no. Denies hypoglycemic incidents, chest pain, shortness of breath, headache.     Review of Systems  All other systems have been reviewed and are negative except as noted in the HPI.     Objective   /62   Pulse 62   Ht 1.727 m (5' 8\")   Wt 86.2 kg (190 lb)   SpO2 98%   BMI 28.89 kg/m²     Physical Exam  Vitals and nursing note reviewed.   Constitutional:       General: She is not in acute distress.     Appearance: Normal appearance.   Eyes:      Extraocular Movements: Extraocular movements intact.      Conjunctiva/sclera: Conjunctivae normal.   Neck:      Vascular: No carotid bruit.   Cardiovascular:      Rate and Rhythm: Normal rate and regular rhythm.   Pulmonary:      Effort: Pulmonary effort is normal.      Breath sounds: Normal breath sounds.   Musculoskeletal:      Right lower leg: No edema.      Left lower leg: No edema.   Neurological:      General: No focal deficit present.      Mental Status: She is alert.   Psychiatric:         Mood and Affect: Mood normal.       Assessment/Plan   Problem List Items Addressed This Visit             ICD-10-CM    Type 2 diabetes mellitus without complication, without long-term current use of insulin (Multi) - Primary E11.9     Chronic, well-controlled.  Continue Ozempic 0.5 mg/week. Low carbohydrate diet and increase in activity. Recheck in 3 months, we will discuss increasing to 1mg at that time if patient is still experiencing difficulty with weight loss.            Relevant Medications    semaglutide 0.25 mg or 0.5 mg (2 mg/3 mL) pen injector    rosuvastatin (Crestor) 5 mg tablet    Other Relevant Orders    POCT glycosylated hemoglobin (Hb A1C) manually resulted " (Completed)    Mixed hyperlipidemia E78.2     Chronic. Start Crestor 5mg daily. Risks and benefits of medication discussed and prescribed. Low fat diet and increase in activity. Recheck in 3 months.          Relevant Medications    rosuvastatin (Crestor) 5 mg tablet

## 2024-08-21 NOTE — ASSESSMENT & PLAN NOTE
Chronic, well-controlled.  Continue Ozempic 0.5 mg/week. Low carbohydrate diet and increase in activity. Recheck in 3 months, we will discuss increasing to 1mg at that time if patient is still experiencing difficulty with weight loss.

## 2024-09-24 ENCOUNTER — HOSPITAL ENCOUNTER (OUTPATIENT)
Dept: OPERATING ROOM | Facility: CLINIC | Age: 49
Setting detail: OUTPATIENT SURGERY
Discharge: HOME | End: 2024-09-24
Payer: COMMERCIAL

## 2024-09-24 VITALS
HEIGHT: 68 IN | BODY MASS INDEX: 29.34 KG/M2 | RESPIRATION RATE: 16 BRPM | OXYGEN SATURATION: 99 % | TEMPERATURE: 97.3 F | SYSTOLIC BLOOD PRESSURE: 114 MMHG | DIASTOLIC BLOOD PRESSURE: 56 MMHG | WEIGHT: 193.56 LBS | HEART RATE: 75 BPM

## 2024-09-24 DIAGNOSIS — Z12.11 SCREENING FOR COLON CANCER: Primary | ICD-10-CM

## 2024-09-24 PROCEDURE — 7100000010 HC PHASE TWO TIME - EACH INCREMENTAL 1 MINUTE

## 2024-09-24 PROCEDURE — 99153 MOD SED SAME PHYS/QHP EA: CPT

## 2024-09-24 PROCEDURE — 3600000002 HC OR TIME - INITIAL BASE CHARGE - PROCEDURE LEVEL TWO

## 2024-09-24 PROCEDURE — 45378 DIAGNOSTIC COLONOSCOPY: CPT | Performed by: INTERNAL MEDICINE

## 2024-09-24 PROCEDURE — 99152 MOD SED SAME PHYS/QHP 5/>YRS: CPT | Mod: 59

## 2024-09-24 PROCEDURE — 3600000007 HC OR TIME - EACH INCREMENTAL 1 MINUTE - PROCEDURE LEVEL TWO

## 2024-09-24 PROCEDURE — 2500000004 HC RX 250 GENERAL PHARMACY W/ HCPCS (ALT 636 FOR OP/ED): Performed by: INTERNAL MEDICINE

## 2024-09-24 PROCEDURE — 7100000009 HC PHASE TWO TIME - INITIAL BASE CHARGE

## 2024-09-24 RX ORDER — FENTANYL CITRATE 50 UG/ML
INJECTION, SOLUTION INTRAMUSCULAR; INTRAVENOUS AS NEEDED
Status: COMPLETED | OUTPATIENT
Start: 2024-09-24 | End: 2024-09-24

## 2024-09-24 RX ORDER — MIDAZOLAM HYDROCHLORIDE 1 MG/ML
INJECTION, SOLUTION INTRAMUSCULAR; INTRAVENOUS AS NEEDED
Status: COMPLETED | OUTPATIENT
Start: 2024-09-24 | End: 2024-09-24

## 2024-09-24 RX ORDER — SODIUM CHLORIDE, SODIUM LACTATE, POTASSIUM CHLORIDE, CALCIUM CHLORIDE 600; 310; 30; 20 MG/100ML; MG/100ML; MG/100ML; MG/100ML
20 INJECTION, SOLUTION INTRAVENOUS CONTINUOUS
Status: CANCELLED | OUTPATIENT
Start: 2024-09-24

## 2024-09-24 ASSESSMENT — PAIN - FUNCTIONAL ASSESSMENT
PAIN_FUNCTIONAL_ASSESSMENT: 0-10
PAIN_FUNCTIONAL_ASSESSMENT: 0-10

## 2024-09-24 ASSESSMENT — PAIN SCALES - GENERAL
PAINLEVEL_OUTOF10: 0 - NO PAIN

## 2024-09-24 ASSESSMENT — COLUMBIA-SUICIDE SEVERITY RATING SCALE - C-SSRS
1. IN THE PAST MONTH, HAVE YOU WISHED YOU WERE DEAD OR WISHED YOU COULD GO TO SLEEP AND NOT WAKE UP?: NO
2. HAVE YOU ACTUALLY HAD ANY THOUGHTS OF KILLING YOURSELF?: NO
6. HAVE YOU EVER DONE ANYTHING, STARTED TO DO ANYTHING, OR PREPARED TO DO ANYTHING TO END YOUR LIFE?: NO

## 2024-09-24 NOTE — DISCHARGE INSTRUCTIONS
Patient Instructions after a Colonoscopy      The anesthetics, sedatives or narcotics which were given to you today will be acting in your body for the next 24 hours, so you might feel a little sleepy or groggy.  This feeling should slowly wear off. Carefully read and follow the instructions.     You received sedation today:  - Do not drive or operate any machinery or power tools of any kind.   - No alcoholic beverages today, not even beer or wine.  - Do not make any important decisions or sign any legal documents.  - No over the counter medications that contain alcohol or that may cause drowsiness.  - Do not make any important decisions or sign any legal documents.    While it is common to experience mild to moderate abdominal distention, gas, or belching after your procedure, if any of these symptoms occur following discharge from the GI Lab or within one week of having your procedure, call the [facility] to be advised whether a visit to your nearest Urgent Care or Emergency Department is indicated.  Take this paper with you if you go.     - If you develop an allergic reaction to the medications that were given during your procedure such as difficulty breathing, rash, hives, severe nausea, vomiting or lightheadedness.  - If you experience chest pain, shortness of breath, severe abdominal pain, fevers and chills.  -If you develop signs and symptoms of bleeding such as blood in your spit, if your stools turn black, tarry, or bloody  - If you have not urinated within 8 hours following your procedure.  - If your IV site becomes painful, red, inflamed, or looks infected.    If you received a biopsy/polypectomy/sphincterotomy the following instructions apply below:    __ Do not use Aspirin containing products, non-steroidal medications or anti-coagulants for one week following your procedure. (Examples of these types of medications are: Advil, Arthrotec, Aleve, Coumadin, Ecotrin, Heparin, Ibuprofen,  Indocin, Motrin, Naprosyn, Nuprin, Plavix, Vioxx, and Voltarin, or their generic forms.  This list is not all-inclusive.  Check with your physician or pharmacist before resuming medications.)   __ Eat a soft diet today.  Avoid foods that are poorly digested for the next 24 hours.  These foods would include: nuts, beans, lettuce, red meats, and fried foods. Start with liquids and advance your diet as tolerated, gradually work up to eating solids.   __ Do not have a Barium Study or Enema for one week.    Your physician recommends the additional following instructions:    -You have a contact number available for emergencies. The signs and symptoms of potential delayed complications were discussed with you. You may return to normal activities tomorrow.  -Resume your previous diet.  -Continue your present medications.   -We are waiting for your pathology results.  -Your physician has recommended a repeat colonoscopy (date to be determined after pending pathology results are reviewed) for surveillance based on pathology results.  -The findings and recommendations have been discussed with you.  -The findings and recommendations were discussed with your family.  - Please see Medication Reconciliation Form for new medication/medications prescribed.       If you experience any problems or have any questions following discharge from the GI Lab, please call: [phone]

## 2024-09-24 NOTE — H&P
"History Of Present Illness  Megan Singh is a 48 y.o. female presenting for colonoscopy for colorectal cancer screening.     Past Medical History  History reviewed. No pertinent past medical history.  Surgical History  History reviewed. No pertinent surgical history.  Social History  She reports that she has never smoked. She has never used smokeless tobacco. She reports current alcohol use. She reports that she does not use drugs.    Family History  Family History   Problem Relation Name Age of Onset    Hypertension Mother      Stroke Daughter      Breast cancer Maternal Grandmother      Stomach cancer Paternal Grandfather          Allergies  No Known Allergies  Review of Systems  A 10+ point review of systems was completed and otherwise negative.    Pre-sedation Evaluation:  ASA Classification - ASA 2 - Patient with mild systemic disease with no functional limitations  Mallampati Score - II (hard and soft palate, upper portion of tonsils and uvula visible)    Physical Exam  CONSTITUTIONAL: no acute distress, appears stated age  PULMONARY: clear to auscultation bilaterally  CARDIOVASCULAR: regular rate and rhythm  ABDOMEN: soft, non-tender  NEUROLOGIC: alert and oriented to person/place/time       Last Recorded Vitals  Blood pressure 118/63, pulse 92, temperature 36.9 °C (98.4 °F), resp. rate 20, height 1.727 m (5' 8\"), weight 87.8 kg (193 lb 9 oz).     Assessment/Plan   Will proceed with screening colonoscopy     PTA/Current Medications:  (Not in a hospital admission)    Current Outpatient Medications   Medication Sig Dispense Refill    rosuvastatin (Crestor) 5 mg tablet Take 1 tablet (5 mg) by mouth once daily. 90 tablet 0    semaglutide 0.25 mg or 0.5 mg (2 mg/3 mL) pen injector Inject 0.5 mg under the skin every 7 days. 9 mL 0    polyethylene glycol-electrolytes (Nulytely) 420 gram solution Drink 1/2 starting at 6 pm the night before your procedure then drink the 2nd 1/2 5 hours before procedure arrival tme " 4000 mL 0     No current facility-administered medications for this encounter.     Lisa Brooks MD

## 2024-09-25 ASSESSMENT — PAIN SCALES - GENERAL: PAINLEVEL_OUTOF10: 0 - NO PAIN

## 2024-10-09 ENCOUNTER — APPOINTMENT (OUTPATIENT)
Dept: OBSTETRICS AND GYNECOLOGY | Facility: CLINIC | Age: 49
End: 2024-10-09
Payer: COMMERCIAL

## 2024-10-14 ENCOUNTER — APPOINTMENT (OUTPATIENT)
Dept: OBSTETRICS AND GYNECOLOGY | Facility: CLINIC | Age: 49
End: 2024-10-14
Payer: COMMERCIAL

## 2024-10-21 ENCOUNTER — APPOINTMENT (OUTPATIENT)
Dept: PRIMARY CARE | Facility: CLINIC | Age: 49
End: 2024-10-21
Payer: COMMERCIAL

## 2024-10-21 ENCOUNTER — OFFICE VISIT (OUTPATIENT)
Dept: OBSTETRICS AND GYNECOLOGY | Facility: CLINIC | Age: 49
End: 2024-10-21
Payer: COMMERCIAL

## 2024-10-21 VITALS — SYSTOLIC BLOOD PRESSURE: 102 MMHG | DIASTOLIC BLOOD PRESSURE: 62 MMHG

## 2024-10-21 DIAGNOSIS — Z12.31 ENCOUNTER FOR SCREENING MAMMOGRAM FOR MALIGNANT NEOPLASM OF BREAST: ICD-10-CM

## 2024-10-21 DIAGNOSIS — Z12.11 SCREEN FOR COLON CANCER: ICD-10-CM

## 2024-10-21 DIAGNOSIS — N95.1 PERIMENOPAUSE: ICD-10-CM

## 2024-10-21 DIAGNOSIS — Z01.419 VISIT FOR GYNECOLOGIC EXAMINATION: Primary | ICD-10-CM

## 2024-10-21 PROCEDURE — 3078F DIAST BP <80 MM HG: CPT | Performed by: OBSTETRICS & GYNECOLOGY

## 2024-10-21 PROCEDURE — 3050F LDL-C >= 130 MG/DL: CPT | Performed by: OBSTETRICS & GYNECOLOGY

## 2024-10-21 PROCEDURE — 99396 PREV VISIT EST AGE 40-64: CPT | Performed by: OBSTETRICS & GYNECOLOGY

## 2024-10-21 PROCEDURE — 1036F TOBACCO NON-USER: CPT | Performed by: OBSTETRICS & GYNECOLOGY

## 2024-10-21 PROCEDURE — 3074F SYST BP LT 130 MM HG: CPT | Performed by: OBSTETRICS & GYNECOLOGY

## 2024-10-21 PROCEDURE — 3044F HG A1C LEVEL LT 7.0%: CPT | Performed by: OBSTETRICS & GYNECOLOGY

## 2024-10-21 ASSESSMENT — ENCOUNTER SYMPTOMS
FATIGUE: 0
ADENOPATHY: 0
UNEXPECTED WEIGHT CHANGE: 0
WEAKNESS: 0
HEADACHES: 0
LOSS OF SENSATION IN FEET: 0
ACTIVITY CHANGE: 0
OCCASIONAL FEELINGS OF UNSTEADINESS: 0
DIFFICULTY URINATING: 0
ABDOMINAL PAIN: 0
DYSURIA: 0
ABDOMINAL DISTENTION: 0
JOINT SWELLING: 0
DIZZINESS: 0
SHORTNESS OF BREATH: 0
COLOR CHANGE: 0
DEPRESSION: 0
CHEST TIGHTNESS: 0

## 2024-10-21 ASSESSMENT — PATIENT HEALTH QUESTIONNAIRE - PHQ9
1. LITTLE INTEREST OR PLEASURE IN DOING THINGS: NOT AT ALL
SUM OF ALL RESPONSES TO PHQ9 QUESTIONS 1 & 2: 0
2. FEELING DOWN, DEPRESSED OR HOPELESS: NOT AT ALL

## 2024-10-21 ASSESSMENT — PAIN SCALES - GENERAL: PAINLEVEL_OUTOF10: 0-NO PAIN

## 2024-10-21 NOTE — PROGRESS NOTES
"Annual  Subjective   Mgean Singh is a 48 y.o. female who is here for a routine exam.   Complaints:   Menses still fairly regular; denies any significant vasomotor, sleep, mood symptoms  PMHx: H/O DEPRESSION.  SEASONAL ALLERGIES. Mastalgia; cyclic.  Surg Hx: TOOTH IMPLANT                 D&C   Father: alive; Mother: alive   \"JOELLE\". Living w Spouse, Children.  Occupation: . No Tobacco; Alcohol:  Social.  Last pap  2021 ENDOMETRIAL CELLS, 2019-NEG/hpv pos (other)   Abn pap 2021 ENDOMETRIAL CELLS.   Mamm 2024 benign   Pelvic US 2022: 14 MM ENDOMETRIUM.; bx=secretory endometrium   Birth control vasectomy.   Periods : every month, lasts 3 days, always 1 heavy day.   Menarche 12. Last Period 10/12/2024  STDs :HPV Positive .   currently sexually active;no exposure concerns.   Total preg 3.  live births  2. Miscarriage(s)  1.   Pregnancy # 1:  spontaneous  2008.   Pregnancy # 2:  2009,  - GEST. DIABETES, GBS POS; Emma. [employer]  Pregnancy # 3:  2011, 8#, , ALEXA ALDRIDGE; IN PT/OT--FOUND TO HAVE HAD \"SMALL STROKE IN UTERO\"; PEDS NEURO ADVISES \"UNDETECTABLE\" BEFORE DELIV.  Still some fine motor skill issues.  Review of Systems   Constitutional:  Negative for activity change, fatigue and unexpected weight change.   Respiratory:  Negative for chest tightness and shortness of breath.    Cardiovascular:  Negative for chest pain and leg swelling.   Gastrointestinal:  Negative for abdominal distention and abdominal pain.   Genitourinary:  Negative for difficulty urinating, dysuria, genital sores, pelvic pain, vaginal bleeding, vaginal discharge and vaginal pain.   Musculoskeletal:  Negative for gait problem and joint swelling.   Skin:  Negative for color change and rash.   Neurological:  Negative for dizziness, weakness and headaches.   Hematological:  Negative for adenopathy.   Objective Visit Vitals  /62   LMP 10/12/2024 (Exact " Date)   OB Status Having periods   Smoking Status Never       General:   Alert and oriented, in no acute distress   Neck: Supple. No visible thyromegaly.    Breast/Axilla: Normal to palpation bilaterally without masses, skin changes, or nipple discharge.    Abdomen: Soft, non-tender, without masses or organomegaly   Vulva: Normal architecture without erythema, masses, or lesions.    Vagina: Normal mucosa without lesions, masses, or atrophy. No abnormal vaginal discharge.    Cervix: Normal without masses, lesions, or signs of cervicitis   Uterus: Normal, mobile, non-enlarged uterus   Adnexa: No palpable masses or  tenderness   Pelvic Floor normal   Psych Normal affect. Normal mood.      Assessment/Plan   Encounter Diagnoses   Name Primary?    Visit for gynecologic examination; no suspicious findings on breast or pelvic exams. Yes    Encounter for screening mammogram for malignant neoplasm of breast; completed and normal for 2024.     Perimenopause; reviewed typical transition, potential bleeding changes; patient asymptomatic thus far.     Screen for colon cancer; baseline colonoscopy completed and negative 9/24/2024 with Dr. Lisa Brooks.    Katelin Field MD

## 2024-11-04 DIAGNOSIS — E11.9 TYPE 2 DIABETES MELLITUS WITHOUT COMPLICATION, WITHOUT LONG-TERM CURRENT USE OF INSULIN (MULTI): ICD-10-CM

## 2024-11-11 ENCOUNTER — APPOINTMENT (OUTPATIENT)
Dept: OBSTETRICS AND GYNECOLOGY | Facility: CLINIC | Age: 49
End: 2024-11-11
Payer: COMMERCIAL

## 2024-11-13 ENCOUNTER — LAB (OUTPATIENT)
Dept: LAB | Facility: LAB | Age: 49
End: 2024-11-13
Payer: COMMERCIAL

## 2024-11-13 DIAGNOSIS — R73.03 PRE-DIABETES: ICD-10-CM

## 2024-11-13 LAB
ALBUMIN SERPL BCP-MCNC: 4.3 G/DL (ref 3.4–5)
ALP SERPL-CCNC: 70 U/L (ref 33–110)
ALT SERPL W P-5'-P-CCNC: 24 U/L (ref 7–45)
ANION GAP SERPL CALC-SCNC: 10 MMOL/L (ref 10–20)
AST SERPL W P-5'-P-CCNC: 20 U/L (ref 9–39)
BILIRUB SERPL-MCNC: 1 MG/DL (ref 0–1.2)
BUN SERPL-MCNC: 17 MG/DL (ref 6–23)
CALCIUM SERPL-MCNC: 9.1 MG/DL (ref 8.6–10.6)
CHLORIDE SERPL-SCNC: 104 MMOL/L (ref 98–107)
CHOLEST SERPL-MCNC: 132 MG/DL (ref 0–199)
CHOLESTEROL/HDL RATIO: 3.1
CO2 SERPL-SCNC: 31 MMOL/L (ref 21–32)
CREAT SERPL-MCNC: 0.87 MG/DL (ref 0.5–1.05)
EGFRCR SERPLBLD CKD-EPI 2021: 82 ML/MIN/1.73M*2
EST. AVERAGE GLUCOSE BLD GHB EST-MCNC: 111 MG/DL
GLUCOSE SERPL-MCNC: 99 MG/DL (ref 74–99)
HBA1C MFR BLD: 5.5 %
HDLC SERPL-MCNC: 42.4 MG/DL
LDLC SERPL CALC-MCNC: 77 MG/DL
NON HDL CHOLESTEROL: 90 MG/DL (ref 0–149)
POTASSIUM SERPL-SCNC: 4.5 MMOL/L (ref 3.5–5.3)
PROT SERPL-MCNC: 6.7 G/DL (ref 6.4–8.2)
SODIUM SERPL-SCNC: 140 MMOL/L (ref 136–145)
TRIGL SERPL-MCNC: 63 MG/DL (ref 0–149)
VLDL: 13 MG/DL (ref 0–40)

## 2024-11-13 PROCEDURE — 80053 COMPREHEN METABOLIC PANEL: CPT

## 2024-11-13 PROCEDURE — 83036 HEMOGLOBIN GLYCOSYLATED A1C: CPT

## 2024-11-13 PROCEDURE — 36415 COLL VENOUS BLD VENIPUNCTURE: CPT

## 2024-11-13 PROCEDURE — 80061 LIPID PANEL: CPT

## 2024-11-20 ENCOUNTER — APPOINTMENT (OUTPATIENT)
Dept: PRIMARY CARE | Facility: CLINIC | Age: 49
End: 2024-11-20
Payer: COMMERCIAL

## 2024-11-20 ENCOUNTER — TELEPHONE (OUTPATIENT)
Dept: PRIMARY CARE | Facility: CLINIC | Age: 49
End: 2024-11-20

## 2024-11-20 VITALS
TEMPERATURE: 97.4 F | SYSTOLIC BLOOD PRESSURE: 110 MMHG | OXYGEN SATURATION: 99 % | WEIGHT: 184 LBS | DIASTOLIC BLOOD PRESSURE: 72 MMHG | HEART RATE: 67 BPM | RESPIRATION RATE: 16 BRPM | BODY MASS INDEX: 27.98 KG/M2

## 2024-11-20 DIAGNOSIS — E11.9 TYPE 2 DIABETES MELLITUS WITHOUT COMPLICATION, WITHOUT LONG-TERM CURRENT USE OF INSULIN (MULTI): Primary | ICD-10-CM

## 2024-11-20 DIAGNOSIS — E78.2 MIXED HYPERLIPIDEMIA: ICD-10-CM

## 2024-11-20 DIAGNOSIS — E11.9 TYPE 2 DIABETES MELLITUS WITHOUT COMPLICATION, WITHOUT LONG-TERM CURRENT USE OF INSULIN (MULTI): ICD-10-CM

## 2024-11-20 PROBLEM — E66.01 MORBID OBESITY (MULTI): Status: ACTIVE | Noted: 2024-11-20

## 2024-11-20 PROBLEM — E78.00 ELEVATED LDL CHOLESTEROL LEVEL: Status: ACTIVE | Noted: 2023-03-15

## 2024-11-20 PROBLEM — J34.89 SINUS PAIN: Status: ACTIVE | Noted: 2022-11-14

## 2024-11-20 PROBLEM — E53.8 COBALAMIN DEFICIENCY: Status: ACTIVE | Noted: 2022-09-12

## 2024-11-20 PROBLEM — R07.89 TIGHTNESS IN CHEST: Status: ACTIVE | Noted: 2020-12-21

## 2024-11-20 PROBLEM — R93.89 ENDOMETRIAL THICKENING ON ULTRASOUND: Status: ACTIVE | Noted: 2024-11-20

## 2024-11-20 PROBLEM — F41.9 ANXIETY: Status: ACTIVE | Noted: 2020-12-21

## 2024-11-20 PROBLEM — E66.9 OBESITY WITH BODY MASS INDEX 30 OR GREATER: Status: ACTIVE | Noted: 2024-11-20

## 2024-11-20 PROBLEM — N92.6 IRREGULAR MENSTRUAL BLEEDING: Status: ACTIVE | Noted: 2024-11-20

## 2024-11-20 PROBLEM — R87.810 HIGH-RISK HUMAN PAPILLOMAVIRUS (HPV) DNA DETECTED IN CERVICAL SPECIMEN: Status: ACTIVE | Noted: 2024-11-20

## 2024-11-20 PROBLEM — R29.898 TIGHTNESS OF NECK: Status: ACTIVE | Noted: 2023-03-15

## 2024-11-20 PROBLEM — R73.09 ELEVATED GLUCOSE: Status: ACTIVE | Noted: 2022-10-31

## 2024-11-20 PROBLEM — F43.20 REACTION, SITUATIONAL: Status: ACTIVE | Noted: 2021-05-03

## 2024-11-20 PROBLEM — K64.4 EXTERNAL HEMORRHOIDS: Status: ACTIVE | Noted: 2024-11-20

## 2024-11-20 RX ORDER — ROSUVASTATIN CALCIUM 5 MG/1
5 TABLET, COATED ORAL DAILY
Qty: 90 TABLET | Refills: 1 | Status: SHIPPED | OUTPATIENT
Start: 2024-11-20 | End: 2025-05-19

## 2024-11-20 ASSESSMENT — PAIN SCALES - GENERAL: PAINLEVEL_OUTOF10: 0-NO PAIN

## 2024-11-20 NOTE — ASSESSMENT & PLAN NOTE
Chronic. Continue Crestor 5mg. Decrease fast food, fried food, processed foods and large amounts of red meat. Increase lean protein, vegetables and exercise. Recheck in 6 months.     Orders:    rosuvastatin (Crestor) 5 mg tablet; Take 1 tablet (5 mg) by mouth once daily.

## 2024-11-20 NOTE — ASSESSMENT & PLAN NOTE
Chronic. Continue Ozempic 0.5mg/week. Low carbohydrate diet and increase in activity. Increase in fiber and fluid intake, avoid fatty/greasy/oily foods. Keep food journal to try to determine triggers. Miralax PRN for constipation. Follow up with Mary Henry in 1 month to recheck symptoms. Recheck with me in 6 months or sooner if needed.     Orders:    rosuvastatin (Crestor) 5 mg tablet; Take 1 tablet (5 mg) by mouth once daily.    semaglutide 0.25 mg or 0.5 mg (2 mg/3 mL) pen injector; Inject 0.5 mg under the skin every 7 days for 28 days.

## 2024-11-20 NOTE — PROGRESS NOTES
Subjective   Patient ID: Megan Singh is a 49 y.o. female who presents for Diabetes (Patient is here for a DM check) and Hyperlipidemia (Patient is here for a CHOL check ).    HPI   DM: On Ozempic 0.5mg/week. Last A1C - 5.5 down from 5.8. Last microalbumin - N/A and GFR - 82. On statin. Was taking Miralax every other day for constipation, this resolved. She now has intermittent diarrhea and stomach cramping x2 weeks. Denies hypoglycemic incidents, chest pain, shortness of breath, headache, dizziness, lightheadedness, nausea, vomiting, blood in stool.     HLD: On Crestor 5mg. Tolerating well. Recent LDL 77 down from 142.     Review of Systems  All other systems have been reviewed and are negative except as noted in the HPI.     Objective   /72 (BP Location: Left arm, Patient Position: Sitting, BP Cuff Size: Large adult)   Pulse 67   Temp 36.3 °C (97.4 °F) (Temporal)   Resp 16   Wt 83.5 kg (184 lb)   LMP 10/12/2024 (Exact Date)   SpO2 99%   BMI 27.98 kg/m²     Physical Exam  Vitals and nursing note reviewed.   Constitutional:       General: She is not in acute distress.     Appearance: Normal appearance.   Eyes:      Extraocular Movements: Extraocular movements intact.      Conjunctiva/sclera: Conjunctivae normal.   Neck:      Vascular: No carotid bruit.   Cardiovascular:      Rate and Rhythm: Normal rate and regular rhythm.   Pulmonary:      Effort: Pulmonary effort is normal.      Breath sounds: Normal breath sounds.   Abdominal:      General: Abdomen is flat. Bowel sounds are normal.      Palpations: Abdomen is soft.      Tenderness: There is no abdominal tenderness.   Musculoskeletal:      Cervical back: Neck supple.      Right lower leg: No edema.      Left lower leg: No edema.   Neurological:      General: No focal deficit present.      Mental Status: She is alert.   Psychiatric:         Mood and Affect: Mood normal.       Assessment/Plan   Assessment & Plan  Type 2 diabetes mellitus without  complication, without long-term current use of insulin (Multi)  Chronic. Continue Ozempic 0.5mg/week. Low carbohydrate diet and increase in activity. Increase in fiber and fluid intake, avoid fatty/greasy/oily foods. Keep food journal to try to determine triggers. Miralax PRN for constipation. Follow up with Mary Henry in 1 month to recheck symptoms. Recheck with me in 6 months or sooner if needed.     Orders:    rosuvastatin (Crestor) 5 mg tablet; Take 1 tablet (5 mg) by mouth once daily.    semaglutide 0.25 mg or 0.5 mg (2 mg/3 mL) pen injector; Inject 0.5 mg under the skin every 7 days for 28 days.    Mixed hyperlipidemia  Chronic. Continue Crestor 5mg. Decrease fast food, fried food, processed foods and large amounts of red meat. Increase lean protein, vegetables and exercise. Recheck in 6 months.     Orders:    rosuvastatin (Crestor) 5 mg tablet; Take 1 tablet (5 mg) by mouth once daily.

## 2024-11-20 NOTE — PROGRESS NOTES
DM FOLLOW UP  E11.9    Megan Singh presents to the office for her DM review. Referring Provider: Seble Gruber PA-C     CURRENT DM PHARMACOTHERAPY   Ozempic 0.5mg weekly     Prev constipation resolved with every other day Miralax.  Pt states she has been having stomach cramps/diarrhea intermittently after being without Ozempic for 1 week.  She has since stopped Miralax.    Reviewed all medications by prescribing practitioner (such as prescriptions, OTCs, herbal therapies and supplements) and documented in the medical record.         Primary/Secondary Prevention   - Statin? Yes  - ACE-I/ARB? No  - Aspirin? No      Last Labs/Vitals/Meds  POC HEMOGLOBIN A1c (%)   Date Value   08/21/2024 5.8     Hemoglobin A1C (%)   Date Value   11/13/2024 5.5   05/15/2024 6.5 (H)   10/29/2022 6.1 (H)     Glucose (mg/dL)   Date Value   11/13/2024 99     Creatinine (mg/dL)   Date Value   11/13/2024 0.87     eGFR (mL/min/1.73m*2)   Date Value   11/13/2024 82       BP Readings from Last 6 Encounters:   11/20/24 110/72   10/21/24 102/62   09/24/24 114/56   08/21/24 116/62   05/22/24 120/78   04/22/24 128/83        Wt Readings from Last 6 Encounters:   11/20/24 83.5 kg (184 lb)   09/24/24 87.8 kg (193 lb 9 oz)   08/21/24 86.2 kg (190 lb)   07/16/24 94.5 kg (208 lb 6.4 oz)   05/22/24 101 kg (222 lb)   04/27/24 101 kg (222 lb)       Current Outpatient Medications on File Prior to Visit   Medication Sig Dispense Refill    [DISCONTINUED] semaglutide 0.25 mg or 0.5 mg (2 mg/3 mL) pen injector Inject 0.5 mg under the skin every 7 days for 28 days. 3 mL 0    [DISCONTINUED] rosuvastatin (Crestor) 5 mg tablet Take 1 tablet (5 mg) by mouth once daily. 90 tablet 0     No current facility-administered medications on file prior to visit.     Assessment/Plan:  A1c decreased 0.3% since last check, 3 months  Discussed current GI sx at length.  Suggest pt remain on current dose, GLP-1.  Will continue to monitor for diarrhea/abdominal  cramping.  Discussed current BMI and pt wt goals.  Pt states she would like to lose another 15-20 lbs.     Plan:   Continue all meds under the continuation of care with the referring provider and clinical pharmacy team.  Follow up with Mary in one month.     Data reviewed and evaluated by DAT Henry RPh, BHAVIK  Treatment and plan changes discussed with Seble Gruber PA-C

## 2024-12-19 ENCOUNTER — APPOINTMENT (OUTPATIENT)
Dept: PRIMARY CARE | Facility: CLINIC | Age: 49
End: 2024-12-19
Payer: COMMERCIAL

## 2024-12-20 ENCOUNTER — APPOINTMENT (OUTPATIENT)
Dept: PRIMARY CARE | Facility: CLINIC | Age: 49
End: 2024-12-20
Payer: COMMERCIAL

## 2024-12-20 DIAGNOSIS — E11.9 TYPE 2 DIABETES MELLITUS WITHOUT COMPLICATION, WITHOUT LONG-TERM CURRENT USE OF INSULIN (MULTI): Primary | ICD-10-CM

## 2024-12-20 RX ORDER — SEMAGLUTIDE 1.34 MG/ML
1 INJECTION, SOLUTION SUBCUTANEOUS
Qty: 3 ML | Refills: 1 | Status: SHIPPED | OUTPATIENT
Start: 2024-12-20

## 2024-12-20 NOTE — PROGRESS NOTES
DM FOLLOW UP  E11.9    Megan Singh is here today at the request of Referring Provider: Seble Grubre PA-C for my opinion regarding diabetes management.  My final recommendations will be communicated back to the requesting provider by way of shared medical record.     Subjective     Past Medical History:  She has no past medical history on file.    Social History:  She reports that she has never smoked. She has never been exposed to tobacco smoke. She has never used smokeless tobacco. She reports current alcohol use. She reports that she does not use drugs.    Allergies:  Patient has no known allergies.    CURRENT DM PHARMACOTHERAPY   Ozempic 0.5mg weekly   Pt reports occasional diarrhea, occasional constipation.  Previous held dose at 0.5mg to monitor SE.    Reviewed all medications by prescribing practitioner (such as prescriptions, OTCs, herbal therapies and supplements) and documented in the medical record.         Primary/Secondary Prevention   - Statin? Yes  - ACE-I/ARB? No  - Aspirin? No    Glucose Monitoring  Patient is not checking glucose at home, has glucometer and supplies available    Lifestyle:  Current diet: improving, trying to limit CHO foods  Current exercise: no regular exercise    Last Labs/Vitals/Meds  POC HEMOGLOBIN A1c (%)   Date Value   08/21/2024 5.8     Hemoglobin A1C (%)   Date Value   11/13/2024 5.5   05/15/2024 6.5 (H)   10/29/2022 6.1 (H)     Glucose (mg/dL)   Date Value   11/13/2024 99     Creatinine (mg/dL)   Date Value   11/13/2024 0.87     eGFR (mL/min/1.73m*2)   Date Value   11/13/2024 82       BP Readings from Last 6 Encounters:   11/20/24 110/72   10/21/24 102/62   09/24/24 114/56   08/21/24 116/62   05/22/24 120/78   04/22/24 128/83        Wt Readings from Last 6 Encounters:   11/20/24 83.5 kg (184 lb)   09/24/24 87.8 kg (193 lb 9 oz)   08/21/24 86.2 kg (190 lb)   07/16/24 94.5 kg (208 lb 6.4 oz)   05/22/24 101 kg (222 lb)   04/27/24 101 kg (222 lb)       Current  Outpatient Medications on File Prior to Visit   Medication Sig Dispense Refill    rosuvastatin (Crestor) 5 mg tablet Take 1 tablet (5 mg) by mouth once daily. 90 tablet 1    [DISCONTINUED] semaglutide 0.25 mg or 0.5 mg (2 mg/3 mL) pen injector Inject 0.5 mg under the skin every 7 days for 28 days. 3 mL 0     No current facility-administered medications on file prior to visit.         Assessment:  Patients diabetes is improved with most recent A1c of 5.8% (Goal < 7%).   Compliance at present is estimated to be good  Pt states that she would like to try and increase Ozempic dose for better glycemic control and wt loss.  Will try dose increase.  Pt to call office if any SE occur.  Discussed pt goal wt and reasonable expectations.  Current BMI=27.        Plan:  1.  START Ozempic 1mg weekly.  Call office if any SE occur.    2.  Continue all other meds under the continuation of care with the referring provider and clinical pharmacy team.  3.  Follow up one month     Data reviewed and evaluated by DAT Henry RPH, CDCES  Treatment and plan changes discussed with Seble Gruber PA-C

## 2025-02-21 ENCOUNTER — APPOINTMENT (OUTPATIENT)
Dept: PRIMARY CARE | Facility: CLINIC | Age: 50
End: 2025-02-21
Payer: COMMERCIAL

## 2025-02-21 VITALS — BODY MASS INDEX: 27.13 KG/M2 | WEIGHT: 179 LBS | HEIGHT: 68 IN

## 2025-02-21 DIAGNOSIS — E11.9 TYPE 2 DIABETES MELLITUS WITHOUT COMPLICATION, WITHOUT LONG-TERM CURRENT USE OF INSULIN (MULTI): ICD-10-CM

## 2025-02-21 NOTE — PROGRESS NOTES
DM FOLLOW UP  E11.9    Megan Singh is a 49 y.o. female here today at the request of Referring Provider: Seble Gruber PA-C for my opinion regarding diabetes management.  My final recommendations will be communicated back to the requesting provider by way of shared medical record.     Patient here today for follow up re: T2DM and weight loss.  She reports frustration due to slow weight loss.  States she would her weight to be closer to 170 lbs.  She weighed this years ago and felt really good.     Patient does not qualify for [facility]    Subjective   Past Medical History:  She has no past medical history on file.    Social History:  She reports that she has never smoked. She has never been exposed to tobacco smoke. She has never used smokeless tobacco. She reports current alcohol use. She reports that she does not use drugs.    Allergies:  Patient has no known allergies.    CURRENT PHARMACOTHERAPY   Current Outpatient Medications   Medication Instructions    Ozempic 1 mg, subcutaneous, Every 7 days    rosuvastatin (CRESTOR) 5 mg, oral, Daily     Pt denies SE/intolerances, reports that she has been injecting in her arm and no longer gets stomach upset or diarrhea.     Reviewed all medications by prescribing practitioner (such as prescriptions, OTCs, herbal therapies, supplements) and documented in the medical record.     Reviewed need for secondary prevention: Statins, ACE-I/ARB, Aspirin    Glucose Monitoring  Patient is not checking glucose at home, has glucometer and supplies available    Lifestyle:  Current diet: in general, follows a healthy diet and in general tries to limit CHO foods  Current exercise: 30 minutes 3-4 days per week, goes to gym    Last Labs/Vitals/Meds  POC HEMOGLOBIN A1c (%)   Date Value   08/21/2024 5.8     Hemoglobin A1C (%)   Date Value   11/13/2024 5.5   05/15/2024 6.5 (H)   10/29/2022 6.1 (H)     Glucose (mg/dL)   Date Value   11/13/2024 99     Creatinine (mg/dL)   Date Value   11/13/2024  0.87     eGFR (mL/min/1.73m*2)   Date Value   11/13/2024 82       BP Readings from Last 3 Encounters:   11/20/24 110/72   10/21/24 102/62   09/24/24 114/56        Wt Readings from Last 6 Encounters:   11/20/24 83.5 kg (184 lb)   09/24/24 87.8 kg (193 lb 9 oz)   08/21/24 86.2 kg (190 lb)   07/16/24 94.5 kg (208 lb 6.4 oz)   05/22/24 101 kg (222 lb)   04/27/24 101 kg (222 lb)     BMI Readings from Last 6 Encounters:   11/20/24 27.98 kg/m²   09/24/24 29.43 kg/m²   08/21/24 28.89 kg/m²   07/16/24 31.69 kg/m²   05/22/24 33.75 kg/m²   04/27/24 33.75 kg/m²       Assessment:  Patients diabetes is stable with most recent A1c of 5.5% (Goal < 7%).  Was 8.8%. Compliance at present is estimated to be good  Discussed continue Ozempic at current dose.  Discussed change injection site to stomach or thigh to see if medication may be better absorbed in those areas.   Follow up with PCP as scheduled      Plan:  1. Continue all medications at current dosages   2.  Follow up in 6 months with clinical pharmacist   3.  Limit carbohydrates to 45 grams per meal and 15 grams per snack       Data reviewed and evaluated by DAT Henry RP, [facility]  Treatment and plan changes discussed with Seble Gruber PA-C

## 2025-05-17 LAB
ALBUMIN SERPL-MCNC: 4.5 G/DL (ref 3.6–5.1)
ALP SERPL-CCNC: 62 U/L (ref 31–125)
ALT SERPL-CCNC: 22 U/L (ref 6–29)
ANION GAP SERPL CALCULATED.4IONS-SCNC: 7 MMOL/L (CALC) (ref 7–17)
AST SERPL-CCNC: 18 U/L (ref 10–35)
BILIRUB SERPL-MCNC: 1.1 MG/DL (ref 0.2–1.2)
BUN SERPL-MCNC: 16 MG/DL (ref 7–25)
CALCIUM SERPL-MCNC: 9.4 MG/DL (ref 8.6–10.2)
CHLORIDE SERPL-SCNC: 103 MMOL/L (ref 98–110)
CHOLEST SERPL-MCNC: 137 MG/DL
CHOLEST/HDLC SERPL: 2.6 (CALC)
CO2 SERPL-SCNC: 30 MMOL/L (ref 20–32)
CREAT SERPL-MCNC: 0.89 MG/DL (ref 0.5–0.99)
EGFRCR SERPLBLD CKD-EPI 2021: 80 ML/MIN/1.73M2
EST. AVERAGE GLUCOSE BLD GHB EST-MCNC: 117 MG/DL
EST. AVERAGE GLUCOSE BLD GHB EST-SCNC: 6.5 MMOL/L
GLUCOSE SERPL-MCNC: 96 MG/DL (ref 65–99)
HBA1C MFR BLD: 5.7 %
HDLC SERPL-MCNC: 52 MG/DL
LDLC SERPL CALC-MCNC: 72 MG/DL (CALC)
NONHDLC SERPL-MCNC: 85 MG/DL (CALC)
POTASSIUM SERPL-SCNC: 4.5 MMOL/L (ref 3.5–5.3)
PROT SERPL-MCNC: 6.9 G/DL (ref 6.1–8.1)
SODIUM SERPL-SCNC: 140 MMOL/L (ref 135–146)
TRIGL SERPL-MCNC: 57 MG/DL

## 2025-05-21 ENCOUNTER — APPOINTMENT (OUTPATIENT)
Dept: PRIMARY CARE | Facility: CLINIC | Age: 50
End: 2025-05-21
Payer: COMMERCIAL

## 2025-05-21 ENCOUNTER — TELEPHONE (OUTPATIENT)
Dept: PRIMARY CARE | Facility: CLINIC | Age: 50
End: 2025-05-21

## 2025-05-21 VITALS
TEMPERATURE: 97.1 F | DIASTOLIC BLOOD PRESSURE: 60 MMHG | HEART RATE: 80 BPM | SYSTOLIC BLOOD PRESSURE: 100 MMHG | BODY MASS INDEX: 26.04 KG/M2 | OXYGEN SATURATION: 96 % | WEIGHT: 171.2 LBS

## 2025-05-21 DIAGNOSIS — E78.2 MIXED HYPERLIPIDEMIA: ICD-10-CM

## 2025-05-21 DIAGNOSIS — Z12.31 SCREENING MAMMOGRAM FOR BREAST CANCER: ICD-10-CM

## 2025-05-21 DIAGNOSIS — Z00.00 WELL ADULT EXAM: ICD-10-CM

## 2025-05-21 DIAGNOSIS — E11.9 TYPE 2 DIABETES MELLITUS WITHOUT COMPLICATION, WITHOUT LONG-TERM CURRENT USE OF INSULIN: Primary | ICD-10-CM

## 2025-05-21 DIAGNOSIS — E11.9 TYPE 2 DIABETES MELLITUS WITHOUT COMPLICATION, WITHOUT LONG-TERM CURRENT USE OF INSULIN: ICD-10-CM

## 2025-05-21 DIAGNOSIS — E55.9 VITAMIN D DEFICIENCY: ICD-10-CM

## 2025-05-21 PROCEDURE — 99214 OFFICE O/P EST MOD 30 MIN: CPT

## 2025-05-21 PROCEDURE — 3074F SYST BP LT 130 MM HG: CPT

## 2025-05-21 PROCEDURE — 1036F TOBACCO NON-USER: CPT

## 2025-05-21 PROCEDURE — 3078F DIAST BP <80 MM HG: CPT

## 2025-05-21 RX ORDER — ROSUVASTATIN CALCIUM 5 MG/1
5 TABLET, COATED ORAL DAILY
Qty: 90 TABLET | Refills: 1 | Status: SHIPPED | OUTPATIENT
Start: 2025-05-21 | End: 2025-11-17

## 2025-05-21 ASSESSMENT — PAIN SCALES - GENERAL: PAINLEVEL_OUTOF10: 0-NO PAIN

## 2025-05-22 LAB
ALBUMIN/CREAT UR: 3 MG/G CREAT
CREAT UR-MCNC: 104 MG/DL (ref 20–275)
MICROALBUMIN UR-MCNC: 0.3 MG/DL

## 2025-05-24 ENCOUNTER — HOSPITAL ENCOUNTER (OUTPATIENT)
Dept: RADIOLOGY | Facility: CLINIC | Age: 50
Discharge: HOME | End: 2025-05-24
Payer: COMMERCIAL

## 2025-05-24 DIAGNOSIS — Z12.31 SCREENING MAMMOGRAM FOR BREAST CANCER: ICD-10-CM

## 2025-05-24 PROCEDURE — 77063 BREAST TOMOSYNTHESIS BI: CPT

## 2025-08-10 DIAGNOSIS — E11.9 TYPE 2 DIABETES MELLITUS WITHOUT COMPLICATION, WITHOUT LONG-TERM CURRENT USE OF INSULIN: ICD-10-CM

## 2025-08-11 RX ORDER — SEMAGLUTIDE 1.34 MG/ML
1 INJECTION, SOLUTION SUBCUTANEOUS
Qty: 9 ML | Refills: 0 | Status: SHIPPED | OUTPATIENT
Start: 2025-08-11

## 2025-08-22 ENCOUNTER — APPOINTMENT (OUTPATIENT)
Dept: PRIMARY CARE | Facility: CLINIC | Age: 50
End: 2025-08-22
Payer: COMMERCIAL

## 2025-09-15 ENCOUNTER — APPOINTMENT (OUTPATIENT)
Dept: PRIMARY CARE | Facility: CLINIC | Age: 50
End: 2025-09-15
Payer: COMMERCIAL

## 2025-11-24 ENCOUNTER — APPOINTMENT (OUTPATIENT)
Dept: PRIMARY CARE | Facility: CLINIC | Age: 50
End: 2025-11-24
Payer: COMMERCIAL